# Patient Record
Sex: FEMALE | URBAN - METROPOLITAN AREA
[De-identification: names, ages, dates, MRNs, and addresses within clinical notes are randomized per-mention and may not be internally consistent; named-entity substitution may affect disease eponyms.]

---

## 2020-10-27 ENCOUNTER — APPOINTMENT (RX ONLY)
Dept: URBAN - METROPOLITAN AREA CLINIC 55 | Facility: CLINIC | Age: 55
Setting detail: DERMATOLOGY
End: 2020-10-27

## 2020-10-27 DIAGNOSIS — Z41.9 ENCOUNTER FOR PROCEDURE FOR PURPOSES OTHER THAN REMEDYING HEALTH STATE, UNSPECIFIED: ICD-10-CM

## 2020-10-27 PROCEDURE — ? ADDITIONAL NOTES

## 2020-10-27 PROCEDURE — ? LATISSE COUNSELING

## 2020-10-27 PROCEDURE — ? OTHER (COSMETIC)

## 2020-10-27 PROCEDURE — ? PRESCRIPTION

## 2020-10-27 RX ORDER — BIMATOPROST 0.3 MG/ML
SOLUTION/ DROPS OPHTHALMIC
Qty: 1 | Refills: 0 | COMMUNITY
Start: 2020-10-27

## 2020-10-27 RX ADMIN — BIMATOPROST: 0.3 SOLUTION/ DROPS OPHTHALMIC at 00:00

## 2020-10-27 NOTE — PROCEDURE: LATISSE COUNSELING
Detail Level: Zone
Latisse Counseling: Latisse should be applied every night using only the accompanying sterile applications. Patient should start by washing face, remove all eye makeup, and remove contact lenses. Then place one drop of Latisse on disposable sterile application and brush cautiously along the skin of the upper eyelid margin at the base of the eyelashes. If any Latisse solution gets into the eye proper, it will not cause harm. Do not rinse it out. Repeat process with a new applicator each time to avoid eye infections from applicator contamination. \\n\\nThe onset of effect is gradual but is not significant in the majority of patients until two months. The effect is not permanent and can be expected to gradually return to the original level upon discontinuation of the product. \\n\\nRisk or Side Effects: \\nPotential for Intraocular Pressure Effects \\nEyelid Skin Darkening \\nPotential for Iris Darkening \\nItching sensation in the eyes and or eye redness \\nPotential for Unexpected Hair Growth or Eyelash Changes \\n\\nIf you develop a new ocular condition (trauma or infection), experience a sudden decrease in visual acuity, have ocular surgery, or develop any ocular reactions, particularly conjunctivitis and eyelid reactions, you should immediately seek your physician's advice concerning the continues use of Latisse solution.

## 2020-10-27 NOTE — PROCEDURE: ADDITIONAL NOTES
Additional Notes: Prescription Authorized by Julianna Hansen M.D. per standing orders as follows:\\nLatisse(bimatoprost ophthalmic solution) 0.03%\\nApply to the upper eyelash line at bedtime after removing contact lenses.\\nDispense one 3ml bottle\\nRefill PRN
Detail Level: Simple

## 2020-10-27 NOTE — PROCEDURE: OTHER (COSMETIC)
Other (Free Text): 1. Are you pregnant or planning on becoming pregnant? No\\n2. Do you have a history of Abnormal Intraocular Pressure or Glaucoma?  No\\n3. Do you have a history of eye pressure problems? No\\n4. Do you have active intraocular inflammation? No\\n5. Do you have a history of hypersensitivity to bimatoprost?  No\\n6. Do you wear contact lenses? No
Detail Level: Zone

## 2020-11-19 ENCOUNTER — APPOINTMENT (RX ONLY)
Dept: URBAN - METROPOLITAN AREA CLINIC 55 | Facility: CLINIC | Age: 55
Setting detail: DERMATOLOGY
End: 2020-11-19

## 2020-11-19 DIAGNOSIS — Z41.9 ENCOUNTER FOR PROCEDURE FOR PURPOSES OTHER THAN REMEDYING HEALTH STATE, UNSPECIFIED: ICD-10-CM

## 2020-11-19 PROCEDURE — ? BOTOX

## 2020-11-19 NOTE — PROCEDURE: BOTOX
Periorbital Skin Units: 4
Nasal Root Units: 0
Show Right And Left Brow Units: No
Show Anterior Platysmal Band Units: Yes
Lot #: [id number]
Detail Level: Zone
Price (Use Numbers Only, No Special Characters Or $): 240
Glabellar Complex Units: 16
Consent: Written consent was obtained.
Post-Care Instructions: After care instructions were provided to the patient.  The patient is getting  in 2 weeks and is moving to [place].  She will return every 3 months to check in at work  and will continue to get Botox at [facility].
Expiration Date (Month Year): 8/23

## 2021-02-24 ENCOUNTER — APPOINTMENT (RX ONLY)
Dept: URBAN - METROPOLITAN AREA CLINIC 55 | Facility: CLINIC | Age: 56
Setting detail: DERMATOLOGY
End: 2021-02-24

## 2021-02-24 DIAGNOSIS — Z41.9 ENCOUNTER FOR PROCEDURE FOR PURPOSES OTHER THAN REMEDYING HEALTH STATE, UNSPECIFIED: ICD-10-CM

## 2021-02-24 PROCEDURE — ? IN-HOUSE DISPENSING PHARMACY

## 2021-02-24 NOTE — PROCEDURE: IN-HOUSE DISPENSING PHARMACY
Product 27 Amount/Unit (Numbers Only): 0
Product 38 Unit Type: mg
Name Of Product 1: Obagi  C-Clarifying Serum 4% Hydroquinone
Detail Level: Zone
Product 1 Units Dispensed: 1
Send Charges To Patient Encounter: Yes
Product 1 Application Directions: Apply to affected area as directed once daily at bedtime
Product 1 Refills: 2
Product 1 Unit Type: bottle(s)

## 2021-05-14 ENCOUNTER — RX ONLY (OUTPATIENT)
Age: 56
Setting detail: RX ONLY
End: 2021-05-14

## 2021-05-14 RX ORDER — HYDROQUINONE 40 MG/ML
SOLUTION TOPICAL
Qty: 1 | Refills: 2 | COMMUNITY
Start: 2021-05-14

## 2021-11-11 ENCOUNTER — APPOINTMENT (RX ONLY)
Dept: URBAN - METROPOLITAN AREA CLINIC 55 | Facility: CLINIC | Age: 56
Setting detail: DERMATOLOGY
End: 2021-11-11

## 2021-11-11 ENCOUNTER — RX ONLY (OUTPATIENT)
Age: 56
Setting detail: RX ONLY
End: 2021-11-11

## 2021-11-11 DIAGNOSIS — Z41.9 ENCOUNTER FOR PROCEDURE FOR PURPOSES OTHER THAN REMEDYING HEALTH STATE, UNSPECIFIED: ICD-10-CM

## 2021-11-11 PROCEDURE — ? BOTOX

## 2021-11-11 RX ORDER — BIMATOPROST 0.3 MG/ML
SOLUTION/ DROPS OPHTHALMIC
Qty: 3 | Refills: 6

## 2021-11-11 NOTE — PROCEDURE: BOTOX
Dilution (U/0.1 Cc): 4
Left Periorbital Skin Units: 0
Show Additional Area 3: Yes
Additional Area 1 Location: under eye “jelly roll”
Show Right And Left Brow Units: No
Consent: Written consent obtained. Risks include but not limited to lid/brow ptosis, bruising, swelling, diplopia, temporary effect, incomplete chemical denervation.
Post-Care Instructions: Patient instructed to not lie down for 4 hours and limit physical activity for 24 hours.
Detail Level: Detailed
Expiration Date (Month Year): 02/29/24
Glabellar Complex Units: 16
Lot #: [id number]
Additional Area 2 Location: Lip

## 2022-11-01 ENCOUNTER — APPOINTMENT (RX ONLY)
Dept: URBAN - METROPOLITAN AREA CLINIC 55 | Facility: CLINIC | Age: 57
Setting detail: DERMATOLOGY
End: 2022-11-01

## 2022-11-01 DIAGNOSIS — Z41.9 ENCOUNTER FOR PROCEDURE FOR PURPOSES OTHER THAN REMEDYING HEALTH STATE, UNSPECIFIED: ICD-10-CM

## 2022-11-01 PROCEDURE — ? IN-HOUSE DISPENSING PHARMACY

## 2022-11-01 PROCEDURE — ? BOTOX

## 2022-11-01 PROCEDURE — ? INVENTORY

## 2022-11-01 NOTE — PROCEDURE: IN-HOUSE DISPENSING PHARMACY
Detail Level: Zone
Product 59 Price/Unit (In Dollars): 0
Product 45 Unit Type: mg
Product 3 Application Directions: Apply nightly or as directed. Use SPF daily.
Product 5 Amount/Unit (Numbers Only): 30
Send Charges To Patient Encounter: No
Name Of Product 1: Anti-Aging Brightening Cream
Render Product Pricing In Note: Yes
Product 2 Application Directions: Apply nightly or as directed.
Product 2 Unit Type: ml
Name Of Product 7: Lidocaine 23% / Tetracaine 7% Cream
Name Of Product 5: Hydrating Tretinoin 0.025% Cream
Product 3 Refills: 11
Product 4 Units Dispensed: 1
Product 7 Refills: 2
Product 7 Application Directions: Apply only as directed
Name Of Product 4: Hydroquinone 8% Emulsion
Name Of Product 2: Dapsone / Spironolactone Gel
Name Of Product 6: Rosacea Triple Combination Gel
Name Of Product 3: Acne Triple Combination Gel

## 2022-11-01 NOTE — PROCEDURE: BOTOX
Show Topical Anesthesia: Yes
Additional Area 5 Units: 0
Show Mentalis Units: No
Periorbital Skin Units: 4
Detail Level: Detailed
Glabellar Complex Units: 12
Consent obtained. Risks include but not limited to lid/brow ptosis, bruising, swelling, diplopia, temporary effect, incomplete chemical denervation.
Additional Area 1 Location: Lip
Show Inventory Tab: Hide
Post-Care Instructions: Patient instructed to not lie down for 4 hours and limit physical activity for 24 hours.

## 2023-02-08 ENCOUNTER — APPOINTMENT (RX ONLY)
Dept: URBAN - METROPOLITAN AREA CLINIC 55 | Facility: CLINIC | Age: 58
Setting detail: DERMATOLOGY
End: 2023-02-08

## 2023-02-08 DIAGNOSIS — Z41.9 ENCOUNTER FOR PROCEDURE FOR PURPOSES OTHER THAN REMEDYING HEALTH STATE, UNSPECIFIED: ICD-10-CM

## 2023-02-08 PROCEDURE — ? IN-HOUSE DISPENSING PHARMACY

## 2023-02-08 NOTE — PROCEDURE: IN-HOUSE DISPENSING PHARMACY
Product 43 Price/Unit (In Dollars): 0
Product 7 Refills: 2
Product 68 Unit Type: mg
Name Of Product 5: Hydrating Tretinoin 0.025% Cream
Render Product Pricing In Note: Yes
Product 5 Refills: 11
Name Of Product 2: Dapsone / Spironolactone Gel
Product 1 Amount/Unit (Numbers Only): 30
Name Of Product 7: Lidocaine 23% / Tetracaine 7% Cream
Product 4 Application Directions: Apply nightly or as directed.
Product 1 Application Directions: Apply nightly or as directed. Use SPF daily.
Product 7 Application Directions: Apply only as directed
Send Charges To Patient Encounter: No
Name Of Product 1: Anti-Aging Brightening Cream
Product 7 Unit Type: ml
Name Of Product 3: Acne Triple Combination Gel
Product 4 Units Dispensed: 1
Name Of Product 4: Hydroquinone 8% Emulsion
Name Of Product 6: Rosacea Triple Combination Gel
Detail Level: Zone

## 2023-02-22 ENCOUNTER — APPOINTMENT (RX ONLY)
Dept: URBAN - METROPOLITAN AREA CLINIC 55 | Facility: CLINIC | Age: 58
Setting detail: DERMATOLOGY
End: 2023-02-22

## 2023-02-22 DIAGNOSIS — Z41.9 ENCOUNTER FOR PROCEDURE FOR PURPOSES OTHER THAN REMEDYING HEALTH STATE, UNSPECIFIED: ICD-10-CM

## 2023-02-22 PROCEDURE — ? BOTOX

## 2023-02-22 NOTE — PROCEDURE: BOTOX
Levator Labii Superioris Units: 0
Show Orbicularis Oculi Units: Yes
Additional Area 1 Location: upper lip
Show Right And Left Pupillary Line Units: No
Incrementing Botox Units: By 0.5 Units
Detail Level: Detailed
Forehead Units: 4
Glabellar Complex Units: 12
Show Inventory Tab: Show
Consent: Verbal consent obtained. Risks include but not limited to lid/brow ptosis, bruising, swelling, diplopia, temporary effect, incomplete chemical denervation.
Post-Care Instructions: Patient instructed to not lie down for 4 hours and limit physical activity for 24 hours.
Additional Area 2 Location: chin
Periorbital Skin Units: 8

## 2023-05-10 ENCOUNTER — APPOINTMENT (RX ONLY)
Dept: URBAN - METROPOLITAN AREA CLINIC 55 | Facility: CLINIC | Age: 58
Setting detail: DERMATOLOGY
End: 2023-05-10

## 2023-05-10 DIAGNOSIS — Z41.9 ENCOUNTER FOR PROCEDURE FOR PURPOSES OTHER THAN REMEDYING HEALTH STATE, UNSPECIFIED: ICD-10-CM

## 2023-05-10 PROCEDURE — ? INVENTORY

## 2023-05-10 PROCEDURE — ? IN-HOUSE DISPENSING PHARMACY

## 2023-05-10 PROCEDURE — ? BOTOX

## 2023-05-10 NOTE — PROCEDURE: BOTOX
Incrementing Botox Units: By 0.5 Units
Show Lateral Platysmal Band Units: Yes
Left Pupillary Line Units: 0
Additional Area 1 Location: upper lip
Detail Level: Detailed
Consent obtained and risk reviewed.
Glabellar Complex Units: 12
Dilution (U/0.1 Cc): 4
Post-Care Instructions: Discussed not to lie down flat for 4 hours or rub the treatment area.
Show Ucl Units: No
Comments: Make up removed from treatment area and cleansed with 70% isopropyl alcohol.
Show Inventory Tab: Show
Periorbital Skin Units: 8

## 2023-05-10 NOTE — PROCEDURE: IN-HOUSE DISPENSING PHARMACY
Product 47 Units Dispensed: 0
Product 69 Unit Type: mg
Name Of Product 6: Rosacea Triple Combination Gel
Product 4 Unit Type: ml
Render Refills If Set To 0: Yes
Name Of Product 2: Dapsone / Spironolactone Gel
Product 7 Application Directions: Apply only as directed
Product 4 Application Directions: Apply nightly or as directed.
Product 5 Application Directions: Apply nightly or as directed. Use SPF daily.
Product 7 Refills: 2
Send Charges To Patient Encounter: No
Name Of Product 1: Anti-Aging Brightening Cream
Product 3 Refills: 11
Product 3 Amount/Unit (Numbers Only): 30
Product 4 Units Dispensed: 1
Name Of Product 4: Hydroquinone 8% Emulsion
Name Of Product 7: Lidocaine 23% / Tetracaine 7% Cream
Name Of Product 3: Acne Triple Combination Gel
Name Of Product 5: Hydrating Tretinoin 0.025% Cream
Detail Level: Zone

## 2023-08-29 ENCOUNTER — APPOINTMENT (RX ONLY)
Dept: URBAN - METROPOLITAN AREA CLINIC 55 | Facility: CLINIC | Age: 58
Setting detail: DERMATOLOGY
End: 2023-08-29

## 2023-08-29 DIAGNOSIS — Z41.9 ENCOUNTER FOR PROCEDURE FOR PURPOSES OTHER THAN REMEDYING HEALTH STATE, UNSPECIFIED: ICD-10-CM

## 2023-08-29 PROCEDURE — ? INVENTORY

## 2023-08-29 PROCEDURE — ? IN-HOUSE DISPENSING PHARMACY

## 2023-08-29 NOTE — PROCEDURE: IN-HOUSE DISPENSING PHARMACY
Product 36 Unit Type: mg
Product 19 Amount/Unit (Numbers Only): 0
Product 1 Unit Type: ml
Product 1 Refills: 2
Product 6 Refills: 11
Product 3 Application Directions: Apply nightly or as directed. Use SPF daily.
Product 7 Amount/Unit (Numbers Only): 30
Send Charges To Patient Encounter: No
Name Of Product 2: Dapsone / Spironolactone Gel
Render Refills If Set To 0: Yes
Name Of Product 3: Acne Triple Combination Gel
Product 2 Application Directions: Apply nightly or as directed.
Name Of Product 4: Hydroquinone 8% Emulsion
Product 7 Application Directions: Apply only as directed
Name Of Product 7: Lidocaine 23% / Tetracaine 7% Cream
Name Of Product 6: Rosacea Triple Combination Gel
Name Of Product 1: Anti-Aging Brightening Cream
Detail Level: Zone
Name Of Product 5: Hydrating Tretinoin 0.025% Cream

## 2023-11-29 ENCOUNTER — APPOINTMENT (RX ONLY)
Dept: URBAN - METROPOLITAN AREA CLINIC 55 | Facility: CLINIC | Age: 58
Setting detail: DERMATOLOGY
End: 2023-11-29

## 2023-11-29 DIAGNOSIS — Z41.9 ENCOUNTER FOR PROCEDURE FOR PURPOSES OTHER THAN REMEDYING HEALTH STATE, UNSPECIFIED: ICD-10-CM

## 2023-11-29 PROCEDURE — ? INVENTORY

## 2023-11-29 PROCEDURE — ? IN-HOUSE DISPENSING PHARMACY

## 2023-11-29 NOTE — PROCEDURE: IN-HOUSE DISPENSING PHARMACY
Product 68 Refills: 0
Product 35 Unit Type: mg
Product 2 Amount/Unit (Numbers Only): 30
Product 4 Refills: 2
Product 4 Unit Type: ml
Detail Level: Zone
Product 6 Application Directions: Apply nightly or as directed. Use SPF daily.
Product 2 Application Directions: Apply nightly or as directed.
Name Of Product 4: Hydroquinone 8% Emulsion
Name Of Product 5: Hydrating Tretinoin 0.025% Cream
Name Of Product 7: Lidocaine 23% / Tetracaine 7% Cream
Product 7 Application Directions: Apply only as directed
Name Of Product 1: Anti-Aging Brightening Cream
Product 3 Refills: 11
Product 4 Units Dispensed: 1
Send Charges To Patient Encounter: No
Name Of Product 6: Rosacea Triple Combination Gel
Name Of Product 2: Dapsone / Spironolactone Gel
Render Refills If Set To 0: Yes
Name Of Product 3: Acne Triple Combination Gel

## 2024-05-22 ENCOUNTER — APPOINTMENT (RX ONLY)
Dept: URBAN - METROPOLITAN AREA CLINIC 55 | Facility: CLINIC | Age: 59
Setting detail: DERMATOLOGY
End: 2024-05-22

## 2024-05-22 DIAGNOSIS — Z41.9 ENCOUNTER FOR PROCEDURE FOR PURPOSES OTHER THAN REMEDYING HEALTH STATE, UNSPECIFIED: ICD-10-CM

## 2024-05-22 PROCEDURE — ? BOTOX

## 2024-05-22 PROCEDURE — ? INVENTORY

## 2024-05-22 NOTE — PROCEDURE: BOTOX
Additional Area 4 Units: 0
Show Masseter Units: Yes
Detail Level: Detailed
Periorbital Skin Units: 8
Show Lcl Units: No
Additional Area 2 Location: Nasalis
Incrementing Botox Units: By 0.5 Units
Dilution (U/0.1 Cc): 4
Consent obtained. Risks include but not limited to lid/brow ptosis, bruising, swelling, diplopia, temporary effect, incomplete chemical denervation.
Show Inventory Tab: Hide
Additional Area 1 Location: Lip
Post-Care Instructions: Patient instructed to not lie down for 4 hours and limit physical activity for 24 hours.
Glabellar Complex Units: 12
Additional Area 3 Location: gummy smile

## 2024-08-13 ENCOUNTER — APPOINTMENT (RX ONLY)
Dept: URBAN - METROPOLITAN AREA CLINIC 55 | Facility: CLINIC | Age: 59
Setting detail: DERMATOLOGY
End: 2024-08-13

## 2024-08-13 DIAGNOSIS — Z41.9 ENCOUNTER FOR PROCEDURE FOR PURPOSES OTHER THAN REMEDYING HEALTH STATE, UNSPECIFIED: ICD-10-CM

## 2024-08-13 PROCEDURE — ? BOTOX

## 2024-08-13 NOTE — PROCEDURE: BOTOX
Consent obtained and risk reviewed.
Show Masseter Units: Yes
Nasal Root Units: 0
Additional Area 1 Location: upper lip
Show Right And Left Periorbital Units: No
Periorbital Skin Units: 8
Dilution (U/0.1 Cc): 4
Post-Care Instructions: Discussed not to lie down flat  or vigorously rub the treatment area for the next 4 hours .
Incrementing Botox Units: By 0.5 Units
Comments: Make up was removed from treatment area and then prepped with 70% isopropyl alcohol prior to injections.
Detail Level: Detailed
Glabellar Complex Units: 12
Show Inventory Tab: Show

## 2024-11-04 ENCOUNTER — APPOINTMENT (RX ONLY)
Dept: URBAN - METROPOLITAN AREA CLINIC 55 | Facility: CLINIC | Age: 59
Setting detail: DERMATOLOGY
End: 2024-11-04

## 2024-11-04 DIAGNOSIS — Z41.9 ENCOUNTER FOR PROCEDURE FOR PURPOSES OTHER THAN REMEDYING HEALTH STATE, UNSPECIFIED: ICD-10-CM

## 2024-11-04 PROCEDURE — ? COSMETIC CONSULTATION: GENERAL

## 2024-11-04 PROCEDURE — ? INVENTORY

## 2024-11-04 PROCEDURE — ? BOTOX

## 2024-11-04 NOTE — PROCEDURE: BOTOX
Consent obtained and risk reviewed.
Show Additional Area 6: Yes
Show Ucl Units: No
Levator Labii Superioris Units: 0
Glabellar Complex Units: 12
Show Inventory Tab: Show
Forehead Units: 4
Additional Area 1 Location: upper lip
Detail Level: Detailed
Incrementing Botox Units: By 0.5 Units
Periorbital Skin Units: 10
Comments: Make up was removed from treatment area and then prepped with 70% isopropyl alcohol prior to injections.
Post-Care Instructions: Discussed not to lie down flat  or vigorously rub the treatment area for the next 4 hours .